# Patient Record
Sex: MALE | Race: WHITE | NOT HISPANIC OR LATINO | URBAN - METROPOLITAN AREA
[De-identification: names, ages, dates, MRNs, and addresses within clinical notes are randomized per-mention and may not be internally consistent; named-entity substitution may affect disease eponyms.]

---

## 2020-12-28 ENCOUNTER — OFFICE VISIT (OUTPATIENT)
Dept: URBAN - METROPOLITAN AREA CLINIC 96 | Facility: CLINIC | Age: 25
End: 2020-12-28

## 2023-05-05 ENCOUNTER — WEB ENCOUNTER (OUTPATIENT)
Dept: URBAN - METROPOLITAN AREA CLINIC 50 | Facility: CLINIC | Age: 28
End: 2023-05-05

## 2023-05-11 ENCOUNTER — DASHBOARD ENCOUNTERS (OUTPATIENT)
Age: 28
End: 2023-05-11

## 2023-05-11 ENCOUNTER — OFFICE VISIT (OUTPATIENT)
Dept: URBAN - METROPOLITAN AREA CLINIC 50 | Facility: CLINIC | Age: 28
End: 2023-05-11
Payer: COMMERCIAL

## 2023-05-11 VITALS
WEIGHT: 120 LBS | BODY MASS INDEX: 17.18 KG/M2 | SYSTOLIC BLOOD PRESSURE: 95 MMHG | HEIGHT: 70 IN | DIASTOLIC BLOOD PRESSURE: 65 MMHG | HEART RATE: 69 BPM | TEMPERATURE: 98.3 F

## 2023-05-11 DIAGNOSIS — R19.7 ACUTE DIARRHEA: ICD-10-CM

## 2023-05-11 PROCEDURE — 99205 OFFICE O/P NEW HI 60 MIN: CPT | Performed by: INTERNAL MEDICINE

## 2023-05-11 NOTE — HPI-TODAY'S VISIT:
27 y.o. WM, starting a career -  Not seen in a long time -- 5 yrs Waking 4-5 times at night Mucus, never solid No blood Occ cramps - not unbearable Joints are lot of pain - everything hurts W/ Remicade and Humira, felt was having lots of colds - plus, no huge change No hx of blood clots

## 2023-05-17 ENCOUNTER — WEB ENCOUNTER (OUTPATIENT)
Dept: URBAN - METROPOLITAN AREA CLINIC 50 | Facility: CLINIC | Age: 28
End: 2023-05-17

## 2023-05-19 LAB
A/G RATIO: 1.4
ABSOLUTE BASOPHILS: 48
ABSOLUTE EOSINOPHILS: 231
ABSOLUTE LYMPHOCYTES: 1856
ABSOLUTE MONOCYTES: 503
ABSOLUTE NEUTROPHILS: 4162
ALBUMIN: 4.3
ALKALINE PHOSPHATASE: 87
ALT (SGPT): 18
AST (SGOT): 20
BASOPHILS: 0.7
BILIRUBIN, TOTAL: 0.4
BUN/CREATININE RATIO: (no result)
BUN: 14
C-REACTIVE PROTEIN, QUANT: 3
CALCIUM: 9.3
CARBON DIOXIDE, TOTAL: 24
CHLORIDE: 104
CHOL/HDLC RATIO: 2.4
CHOLESTEROL, TOTAL: 147
CREATININE: 0.73
EGFR: 129
EOSINOPHILS: 3.4
FERRITIN, SERUM: 21
GLOBULIN, TOTAL: 3
GLUCOSE: 86
HBSAG SCREEN: (no result)
HDL CHOLESTEROL: 62
HEMATOCRIT: 40.3
HEMOGLOBIN: 13.6
HEP A AB, IGM: (no result)
HEP B CORE AB, IGM: (no result)
HEP C VIRUS AB: 0.14
HEPATITIS C ANTIBODY: (no result)
LDL CHOLESTEROL CALC: 73
LYMPHOCYTES: 27.3
MCH: 29.8
MCHC: 33.7
MCV: 88.2
MITOGEN-NIL: >10
MONOCYTES: 7.4
MPV: 9.9
NEUTROPHILS: 61.2
NON HDL CHOLESTEROL: 85
PLATELET COUNT: 262
POTASSIUM: 4.1
PROTEIN, TOTAL: 7.3
QUANTIFERON NIL VALUE: 0.05
QUANTIFERON TB1 AG VALUE: 0
QUANTIFERON TB2 AG VALUE: 0
QUANTIFERON-TB GOLD PLUS: NEGATIVE
RDW: 11.9
RED BLOOD CELL COUNT: 4.57
SODIUM: 137
TPMT ACTIVITY: 14
TRIGLYCERIDES: 42
TSH W/REFLEX TO FT4: 0.7
VITAMIN B12: 1017
VITAMIN D,25-OH,TOTAL,IA: 34
WHITE BLOOD CELL COUNT: 6.8

## 2023-05-24 ENCOUNTER — WEB ENCOUNTER (OUTPATIENT)
Dept: URBAN - METROPOLITAN AREA CLINIC 50 | Facility: CLINIC | Age: 28
End: 2023-05-24

## 2023-08-10 ENCOUNTER — OFFICE VISIT (OUTPATIENT)
Dept: URBAN - METROPOLITAN AREA CLINIC 50 | Facility: CLINIC | Age: 28
End: 2023-08-10